# Patient Record
Sex: FEMALE | Race: WHITE | NOT HISPANIC OR LATINO | Employment: FULL TIME | URBAN - METROPOLITAN AREA
[De-identification: names, ages, dates, MRNs, and addresses within clinical notes are randomized per-mention and may not be internally consistent; named-entity substitution may affect disease eponyms.]

---

## 2021-03-14 ENCOUNTER — IMMUNIZATION (OUTPATIENT)
Dept: INTERNAL MEDICINE | Facility: CLINIC | Age: 26
End: 2021-03-14
Payer: MEDICAID

## 2021-03-14 DIAGNOSIS — Z23 NEED FOR VACCINATION: Primary | ICD-10-CM

## 2021-03-14 PROCEDURE — 0031A COVID-19,VECTOR-NR,RS-AD26,PF,0.5 ML DOSE VACCINE (JANSSEN): CPT | Mod: PBBFAC

## 2021-03-26 ENCOUNTER — OFFICE VISIT (OUTPATIENT)
Dept: FAMILY MEDICINE | Facility: CLINIC | Age: 26
End: 2021-03-26
Payer: MEDICAID

## 2021-03-26 VITALS
HEIGHT: 62 IN | OXYGEN SATURATION: 99 % | SYSTOLIC BLOOD PRESSURE: 128 MMHG | RESPIRATION RATE: 19 BRPM | WEIGHT: 208 LBS | DIASTOLIC BLOOD PRESSURE: 68 MMHG | BODY MASS INDEX: 38.28 KG/M2 | TEMPERATURE: 98 F | HEART RATE: 76 BPM

## 2021-03-26 DIAGNOSIS — F41.9 ANXIETY: ICD-10-CM

## 2021-03-26 DIAGNOSIS — Z86.39 HISTORY OF NON ANEMIC VITAMIN B12 DEFICIENCY: ICD-10-CM

## 2021-03-26 DIAGNOSIS — Z00.00 ENCOUNTER FOR PREVENTIVE HEALTH EXAMINATION: Primary | ICD-10-CM

## 2021-03-26 PROCEDURE — 99385 PR PREVENTIVE VISIT,NEW,18-39: ICD-10-PCS | Mod: S$GLB,,, | Performed by: NURSE PRACTITIONER

## 2021-03-26 PROCEDURE — 99385 PREV VISIT NEW AGE 18-39: CPT | Mod: S$GLB,,, | Performed by: NURSE PRACTITIONER

## 2021-03-26 RX ORDER — CLONAZEPAM 0.5 MG/1
0.5 TABLET ORAL 2 TIMES DAILY PRN
COMMUNITY
End: 2021-03-26 | Stop reason: SDUPTHER

## 2021-03-26 RX ORDER — BUPROPION HYDROCHLORIDE 150 MG/1
TABLET, FILM COATED, EXTENDED RELEASE ORAL
COMMUNITY
Start: 2021-01-13 | End: 2021-03-26 | Stop reason: CLARIF

## 2021-03-26 RX ORDER — CLONAZEPAM 0.5 MG/1
0.5 TABLET ORAL 2 TIMES DAILY PRN
Qty: 30 TABLET | Refills: 0 | Status: SHIPPED | OUTPATIENT
Start: 2021-03-26 | End: 2021-04-25

## 2021-03-26 RX ORDER — BUPROPION HYDROCHLORIDE 300 MG/1
300 TABLET ORAL DAILY
Qty: 30 TABLET | Refills: 11 | Status: SHIPPED | OUTPATIENT
Start: 2021-03-26

## 2021-05-02 LAB
ABS NRBC COUNT: 0 X 10 3/UL (ref 0–0.01)
ABSOLUTE BASOPHIL: 0.05 X 10 3/UL (ref 0–0.22)
ABSOLUTE EOSINOPHIL: 0.21 X 10 3/UL (ref 0.04–0.54)
ABSOLUTE IMMATURE GRAN: 0.02 X 10 3/UL (ref 0–0.04)
ABSOLUTE LYMPHOCYTE: 2.21 X 10 3/UL (ref 0.86–4.75)
ABSOLUTE MONOCYTE: 0.43 X 10 3/UL (ref 0.22–1.08)
ALBUMIN SERPL-MCNC: 4.2 G/DL (ref 3.5–5.2)
ALBUMIN/GLOB SERPL ELPH: 1.4 {RATIO} (ref 1–2.7)
ALP ISOS SERPL LEV INH-CCNC: 85 U/L (ref 35–105)
ALT (SGPT): 8 U/L (ref 0–33)
AMORPH URATE CRY URNS QL MICRO: NEGATIVE
ANION GAP SERPL CALC-SCNC: 13 MMOL/L (ref 8–17)
AST SERPL-CCNC: 10 U/L (ref 0–32)
B12: 602 PG/ML (ref 232–1245)
BACTERIA #/AREA URNS HPF: ABNORMAL /[HPF]
BASOPHILS NFR BLD: 0.7 % (ref 0.2–1.2)
BILIRUB UR QL STRIP: NEGATIVE
BILIRUBIN, TOTAL: 0.21 MG/DL (ref 0–1.2)
BUN/CREAT SERPL: 15.9 (ref 6–20)
CALCIUM SERPL-MCNC: 9.2 MG/DL (ref 8.6–10.2)
CARBON DIOXIDE, CO2: 23 MMOL/L (ref 22–29)
CHLORIDE: 103 MMOL/L (ref 98–107)
CHOLEST SERPL-MSCNC: 198 MG/DL (ref 100–200)
CLARITY UR: CLEAR
COLOR UR: YELLOW
CREAT SERPL-MCNC: 0.9 MG/DL (ref 0.5–0.9)
EOSINOPHIL NFR BLD: 3 % (ref 0.7–7)
EPITHELIAL CELLS: ABNORMAL
ESTIMATED AVERAGE GLUCOSE: 101 MG/DL
GFR ESTIMATION: 75.68
GLOBULIN: 2.9 G/DL (ref 1.5–4.5)
GLUCOSE (UA): NEGATIVE MG/DL
GLUCOSE: 86 MG/DL (ref 74–106)
HBA1C MFR BLD: 5.1 % (ref 4–6)
HCT VFR BLD AUTO: 41.5 % (ref 37–47)
HDLC SERPL-MCNC: 76 MG/DL
HGB BLD-MCNC: 12.7 G/DL (ref 12–16)
IMMATURE GRANULOCYTES: 0.3 % (ref 0–0.5)
KETONES UR QL STRIP: NEGATIVE MG/DL
LDL/HDL RATIO: 1.4 (ref 1–3)
LDLC SERPL CALC-MCNC: 103.2 MG/DL (ref 0–100)
LEUKOCYTE ESTERASE UR QL STRIP: ABNORMAL
LYMPHOCYTES NFR BLD: 31.2 % (ref 19.3–53.1)
MCH RBC QN AUTO: 27.4 PG (ref 27–32)
MCHC RBC AUTO-ENTMCNC: 30.6 G/DL (ref 32–36)
MCV RBC AUTO: 89.4 FL (ref 82–100)
MONOCYTES NFR BLD: 6.1 % (ref 4.7–12.5)
MUCOUS THREADS URNS QL MICRO: NEGATIVE
NEUTROPHILS # BLD AUTO: 4.16 X 10 3/UL (ref 2.15–7.56)
NEUTROPHILS NFR BLD: 58.7 %
NITRITE UR QL STRIP: NEGATIVE
NUCLEATED RED BLOOD CELLS: 0 /100 WBC (ref 0–0.2)
OCCULT BLOOD: NEGATIVE
PH, URINE: 6 (ref 5–7.5)
PLATELET # BLD AUTO: 318 X 10 3/UL (ref 135–400)
POTASSIUM: 4.6 MMOL/L (ref 3.5–5.1)
PROT SNV-MCNC: 7.1 G/DL (ref 6.4–8.3)
PROT UR QL STRIP: NEGATIVE MG/DL
RBC # BLD AUTO: 4.64 X 10 6/UL (ref 4.2–5.4)
RBC/HPF: NEGATIVE
RDW-SD: 46.2 FL (ref 37–54)
SODIUM: 139 MMOL/L (ref 136–145)
SP GR UR STRIP: 1.01 (ref 1–1.03)
T4, FREE: 1.02 NG/DL (ref 0.93–1.7)
TRIGL SERPL-MCNC: 94 MG/DL (ref 0–150)
TSH W/REFLEX TO FT4: 8.36 UIU/ML (ref 0.27–4.2)
UREA NITROGEN (BUN): 14.3 MG/DL (ref 6–20)
URINE CULTURE, ROUTINE: NORMAL
UROBILINOGEN, URINE: NORMAL E.U./DL (ref 0–1)
WBC # BLD: 7.08 X 10 3/UL (ref 4.3–10.8)
WBC/HPF: ABNORMAL

## 2021-05-03 DIAGNOSIS — R79.89 ABNORMAL THYROID BLOOD TEST: Primary | ICD-10-CM

## 2021-05-04 DIAGNOSIS — E03.9 HYPOTHYROIDISM, UNSPECIFIED TYPE: Primary | ICD-10-CM

## 2021-05-04 LAB
T4, FREE: 1.08 NG/DL (ref 0.93–1.7)
TSH SERPL DL<=0.005 MIU/L-ACNC: 9.71 UIU/ML (ref 0.27–4.2)

## 2021-05-04 RX ORDER — LEVOTHYROXINE SODIUM 25 UG/1
25 TABLET ORAL
Qty: 30 TABLET | Refills: 1 | Status: SHIPPED | OUTPATIENT
Start: 2021-05-04 | End: 2021-12-23 | Stop reason: SDUPTHER

## 2021-07-01 ENCOUNTER — PATIENT MESSAGE (OUTPATIENT)
Dept: ADMINISTRATIVE | Facility: OTHER | Age: 26
End: 2021-07-01

## 2021-12-22 ENCOUNTER — OFFICE VISIT (OUTPATIENT)
Dept: FAMILY MEDICINE | Facility: CLINIC | Age: 26
End: 2021-12-22
Payer: MEDICAID

## 2021-12-22 VITALS
TEMPERATURE: 98 F | SYSTOLIC BLOOD PRESSURE: 108 MMHG | WEIGHT: 220.13 LBS | HEART RATE: 83 BPM | HEIGHT: 61 IN | BODY MASS INDEX: 41.56 KG/M2 | OXYGEN SATURATION: 98 % | DIASTOLIC BLOOD PRESSURE: 72 MMHG

## 2021-12-22 DIAGNOSIS — E78.5 HYPERLIPIDEMIA, UNSPECIFIED HYPERLIPIDEMIA TYPE: ICD-10-CM

## 2021-12-22 DIAGNOSIS — Z00.00 ROUTINE GENERAL MEDICAL EXAMINATION AT A HEALTH CARE FACILITY: Primary | ICD-10-CM

## 2021-12-22 DIAGNOSIS — E66.01 CLASS 3 SEVERE OBESITY WITH BODY MASS INDEX (BMI) OF 40.0 TO 44.9 IN ADULT, UNSPECIFIED OBESITY TYPE, UNSPECIFIED WHETHER SERIOUS COMORBIDITY PRESENT: ICD-10-CM

## 2021-12-22 DIAGNOSIS — M54.9 BACK PAIN, UNSPECIFIED BACK LOCATION, UNSPECIFIED BACK PAIN LATERALITY, UNSPECIFIED CHRONICITY: ICD-10-CM

## 2021-12-22 DIAGNOSIS — F41.9 ANXIETY: ICD-10-CM

## 2021-12-22 LAB
ABS NRBC COUNT: 0 X 10 3/UL (ref 0–0.01)
ABSOLUTE BASOPHIL: 0.06 X 10 3/UL (ref 0–0.22)
ABSOLUTE EOSINOPHIL: 0.33 X 10 3/UL (ref 0.04–0.54)
ABSOLUTE IMMATURE GRAN: 0.03 X 10 3/UL (ref 0–0.04)
ABSOLUTE LYMPHOCYTE: 2.82 X 10 3/UL (ref 0.86–4.75)
ABSOLUTE MONOCYTE: 0.52 X 10 3/UL (ref 0.22–1.08)
ALBUMIN SERPL-MCNC: 4.5 G/DL (ref 3.5–5.2)
ALP ISOS SERPL LEV INH-CCNC: 84 U/L (ref 35–105)
ALT (SGPT): 9 U/L (ref 0–33)
ANION GAP SERPL CALC-SCNC: 13 MMOL/L (ref 8–17)
AST SERPL-CCNC: 18 U/L (ref 0–32)
BASOPHILS NFR BLD: 0.7 % (ref 0.2–1.2)
BILIRUB CONJ+UNCONJ SERPL-MCNC: NORMAL MG/DL (ref 0.1–0.8)
BILIRUBIN DIRECT+TOT PNL SERPL-MCNC: <0.2 MG/DL (ref 0–0.3)
BILIRUBIN, TOTAL: 0.16 MG/DL (ref 0–1.2)
BUN/CREAT SERPL: 14.1 (ref 6–20)
CALCIUM SERPL-MCNC: 9.6 MG/DL (ref 8.6–10.2)
CARBON DIOXIDE, CO2: 22 MMOL/L (ref 22–29)
CHLORIDE: 107 MMOL/L (ref 98–107)
CHOLEST SERPL-MSCNC: 197 MG/DL (ref 100–200)
CREAT SERPL-MCNC: 0.76 MG/DL (ref 0.5–0.9)
EOSINOPHIL NFR BLD: 3.9 % (ref 0.7–7)
ESTIMATED AVERAGE GLUCOSE: 103 MG/DL
GFR ESTIMATION: 91.29
GLOBULIN: 3 G/DL (ref 1.5–4.5)
GLUCOSE: 74 MG/DL (ref 74–106)
HBA1C MFR BLD: 5.2 % (ref 4–6)
HCT VFR BLD AUTO: 43.9 % (ref 37–47)
HDLC SERPL-MCNC: 61 MG/DL
HGB BLD-MCNC: 13.6 G/DL (ref 12–16)
IMMATURE GRANULOCYTES: 0.4 % (ref 0–0.5)
LDL/HDL RATIO: 1.9 (ref 1–3)
LDLC SERPL CALC-MCNC: 115.6 MG/DL (ref 0–100)
LYMPHOCYTES NFR BLD: 33.5 % (ref 19.3–53.1)
MCH RBC QN AUTO: 26.5 PG (ref 27–32)
MCHC RBC AUTO-ENTMCNC: 31 G/DL (ref 32–36)
MCV RBC AUTO: 85.6 FL (ref 82–100)
MONOCYTES NFR BLD: 6.2 % (ref 4.7–12.5)
NEUTROPHILS # BLD AUTO: 4.66 X 10 3/UL (ref 2.15–7.56)
NEUTROPHILS NFR BLD: 55.3 % (ref 34–71.1)
NUCLEATED RED BLOOD CELLS: 0 /100 WBC (ref 0–0.2)
PLATELET # BLD AUTO: 418 X 10 3/UL (ref 135–400)
POTASSIUM: 4.3 MMOL/L (ref 3.5–5.1)
PROT SNV-MCNC: 7.5 G/DL (ref 6.4–8.3)
RBC # BLD AUTO: 5.13 X 10 6/UL (ref 4.2–5.4)
RDW-SD: 41.2 FL (ref 37–54)
SODIUM: 142 MMOL/L (ref 136–145)
TRIGL SERPL-MCNC: 102 MG/DL (ref 0–150)
TSH SERPL DL<=0.005 MIU/L-ACNC: 5.51 UIU/ML (ref 0.27–4.2)
UREA NITROGEN (BUN): 10.7 MG/DL (ref 6–20)
VITAMIN D (25OHD): 18.7 NG/ML
WBC # BLD: 8.42 X 10 3/UL (ref 4.3–10.8)

## 2021-12-22 PROCEDURE — 99204 PR OFFICE/OUTPT VISIT, NEW, LEVL IV, 45-59 MIN: ICD-10-PCS | Mod: S$GLB,,, | Performed by: INTERNAL MEDICINE

## 2021-12-22 PROCEDURE — 1160F RVW MEDS BY RX/DR IN RCRD: CPT | Mod: CPTII,S$GLB,, | Performed by: INTERNAL MEDICINE

## 2021-12-22 PROCEDURE — 1159F PR MEDICATION LIST DOCUMENTED IN MEDICAL RECORD: ICD-10-PCS | Mod: CPTII,S$GLB,, | Performed by: INTERNAL MEDICINE

## 2021-12-22 PROCEDURE — 1160F PR REVIEW ALL MEDS BY PRESCRIBER/CLIN PHARMACIST DOCUMENTED: ICD-10-PCS | Mod: CPTII,S$GLB,, | Performed by: INTERNAL MEDICINE

## 2021-12-22 PROCEDURE — 1159F MED LIST DOCD IN RCRD: CPT | Mod: CPTII,S$GLB,, | Performed by: INTERNAL MEDICINE

## 2021-12-22 PROCEDURE — 99204 OFFICE O/P NEW MOD 45 MIN: CPT | Mod: S$GLB,,, | Performed by: INTERNAL MEDICINE

## 2021-12-22 RX ORDER — PHENTERMINE HYDROCHLORIDE 37.5 MG/1
37.5 TABLET ORAL
Qty: 30 TABLET | Refills: 0 | Status: SHIPPED | OUTPATIENT
Start: 2021-12-22 | End: 2022-01-21

## 2021-12-22 RX ORDER — MELOXICAM 15 MG/1
15 TABLET ORAL DAILY
Qty: 30 TABLET | Refills: 6 | Status: SHIPPED | OUTPATIENT
Start: 2021-12-22

## 2021-12-23 DIAGNOSIS — E03.9 HYPOTHYROIDISM, UNSPECIFIED TYPE: ICD-10-CM

## 2021-12-23 RX ORDER — LEVOTHYROXINE SODIUM 25 UG/1
25 TABLET ORAL
Qty: 90 TABLET | Refills: 3 | Status: SHIPPED | OUTPATIENT
Start: 2021-12-23 | End: 2022-02-21

## 2022-01-24 ENCOUNTER — TELEPHONE (OUTPATIENT)
Dept: FAMILY MEDICINE | Facility: CLINIC | Age: 27
End: 2022-01-24
Payer: MEDICAID

## 2022-01-24 NOTE — TELEPHONE ENCOUNTER
----- Message from Maame Charlton LPN sent at 1/24/2022  4:31 PM CST -----  Regarding: needs an appt    ----- Message -----  From: Peri Leggett  Sent: 1/24/2022   4:02 PM CST  To: [facility] Staff    Type:  Sooner Apoointment Request    Caller is requesting a sooner appointment.  Caller declined first available appointment listed below.  Caller will not accept being placed on the waitlist and is requesting a message be sent to doctor.  Name of Caller: Vy Quiroga  When is the first available appointment? 03/31  Symptoms: Mental health   Would the patient rather a call back or a response via [facility]? Call back   Best Call Back Number: [phone] (home)

## 2022-01-24 NOTE — TELEPHONE ENCOUNTER
----- Message from Peri Leggett sent at 1/24/2022  4:00 PM CST -----  Type:  Sooner Apoointment Request    Caller is requesting a sooner appointment.  Caller declined first available appointment listed below.  Caller will not accept being placed on the waitlist and is requesting a message be sent to doctor.  Name of Caller: Vy Junaid  When is the first available appointment? 03/31  Symptoms: Mental health   Would the patient rather a call back or a response via [facility]? Call back   Best Call Back Number: [phone] (home)

## 2022-01-25 ENCOUNTER — TELEPHONE (OUTPATIENT)
Dept: FAMILY MEDICINE | Facility: CLINIC | Age: 27
End: 2022-01-25
Payer: MEDICAID

## 2022-01-25 NOTE — TELEPHONE ENCOUNTER
----- Message from Ángela Peralta MA sent at 1/25/2022  4:09 PM CST -----  Contact: Pt    ----- Message -----  From: Ramon Larose  Sent: 1/25/2022   3:26 PM CST  To: [facility] Staff    Type:  Patient Returning Call    Who Called: pt   Who Left Message for Patient:nurse   Does the patient know what this is regarding?: mental health   Would the patient rather a call back or a response via [facility]? Phone   Best Call Back Number: [phone]  Additional Information: pls call around 4 pm in time for pt to get off and get to her car

## 2022-01-25 NOTE — TELEPHONE ENCOUNTER
I spoke to patient and earliest appt is 2/10/2022 but she said she is having mental issues, can she get a referral or can we see her this week.  Thanks

## 2022-10-13 ENCOUNTER — OFFICE VISIT (OUTPATIENT)
Dept: FAMILY MEDICINE | Facility: CLINIC | Age: 27
End: 2022-10-13
Payer: MEDICAID

## 2022-10-13 VITALS
BODY MASS INDEX: 40.59 KG/M2 | DIASTOLIC BLOOD PRESSURE: 66 MMHG | HEART RATE: 71 BPM | TEMPERATURE: 98 F | SYSTOLIC BLOOD PRESSURE: 105 MMHG | WEIGHT: 215 LBS | HEIGHT: 61 IN | OXYGEN SATURATION: 99 %

## 2022-10-13 DIAGNOSIS — Z00.00 ROUTINE ADULT HEALTH MAINTENANCE: ICD-10-CM

## 2022-10-13 DIAGNOSIS — Z11.59 ENCOUNTER FOR SCREENING FOR OTHER VIRAL DISEASES: ICD-10-CM

## 2022-10-13 DIAGNOSIS — F41.9 ANXIETY AND DEPRESSION: ICD-10-CM

## 2022-10-13 DIAGNOSIS — F32.A ANXIETY AND DEPRESSION: ICD-10-CM

## 2022-10-13 DIAGNOSIS — R53.83 FATIGUE, UNSPECIFIED TYPE: Primary | ICD-10-CM

## 2022-10-13 PROCEDURE — 99214 PR OFFICE/OUTPT VISIT, EST, LEVL IV, 30-39 MIN: ICD-10-PCS | Mod: S$GLB,,, | Performed by: OBSTETRICS & GYNECOLOGY

## 2022-10-13 PROCEDURE — 99214 OFFICE O/P EST MOD 30 MIN: CPT | Mod: S$GLB,,, | Performed by: OBSTETRICS & GYNECOLOGY

## 2022-10-13 PROCEDURE — 1159F PR MEDICATION LIST DOCUMENTED IN MEDICAL RECORD: ICD-10-PCS | Mod: CPTII,S$GLB,, | Performed by: OBSTETRICS & GYNECOLOGY

## 2022-10-13 PROCEDURE — 3078F PR MOST RECENT DIASTOLIC BLOOD PRESSURE < 80 MM HG: ICD-10-PCS | Mod: CPTII,S$GLB,, | Performed by: OBSTETRICS & GYNECOLOGY

## 2022-10-13 PROCEDURE — 1159F MED LIST DOCD IN RCRD: CPT | Mod: CPTII,S$GLB,, | Performed by: OBSTETRICS & GYNECOLOGY

## 2022-10-13 PROCEDURE — 3074F SYST BP LT 130 MM HG: CPT | Mod: CPTII,S$GLB,, | Performed by: OBSTETRICS & GYNECOLOGY

## 2022-10-13 PROCEDURE — 3074F PR MOST RECENT SYSTOLIC BLOOD PRESSURE < 130 MM HG: ICD-10-PCS | Mod: CPTII,S$GLB,, | Performed by: OBSTETRICS & GYNECOLOGY

## 2022-10-13 PROCEDURE — 3078F DIAST BP <80 MM HG: CPT | Mod: CPTII,S$GLB,, | Performed by: OBSTETRICS & GYNECOLOGY

## 2022-10-13 RX ORDER — ESCITALOPRAM OXALATE 10 MG/1
10 TABLET ORAL DAILY
Qty: 30 TABLET | Refills: 2 | Status: SHIPPED | OUTPATIENT
Start: 2022-10-13 | End: 2023-10-13

## 2022-10-13 NOTE — PROGRESS NOTES
Subjective:   Patient ID: Vy Quiroga is a 27 y.o. female who presents for evaluation today.    Chief Complaint:  Depression and Anxiety (Pt states that she's having a hard time holding her composure in places that she needs to be controlled. Sleeping a lot and always exhausted.)      History of Present Illness  HPI  Anxiety/Depression  Patient reports symptoms of anxiety and depression.   Onset of symptoms was approximately a few months ago.    Symptoms have been gradually worsening since that time.   She reports the following symptoms: fatigue difficulty concentrating feelings of losing control racing thoughts depressed mood.   Denies psychomotor agitation, psychomotor retardation, suicidal attempt, suicidal thoughts, suicidal thoughts with specific plan, and suicidal thoughts without plan.   Previous treatment includes medication Wellbutrin, Zoloft, and clonazepam .   She reports the following medication side effects: none.    Fatigue  She states she is exhausted all the time. States she goes to bed at 7:30pm and wakes up at 3:45-4am. Admits to stress, anxiety, and depression.     FAMILY HISTORY  History reviewed. No pertinent family history.  SOCIAL HISTORY  Social History     Tobacco Use   Smoking Status Never   Smokeless Tobacco Current   Tobacco Comments    vape   ,   Social History     Substance and Sexual Activity   Alcohol Use Yes    Comment: Ocassionally     MEDICATIONS  No outpatient medications have been marked as taking for the 10/13/22 encounter (Office Visit) with Lore Samaniego NP.     Review of Systems   Review of Systems   Constitutional:  Positive for fatigue. Negative for activity change, appetite change, fever and unexpected weight change.   HENT:  Negative for dental problem, hearing loss, sneezing, sore throat, trouble swallowing and voice change.    Eyes:  Negative for visual disturbance.   Respiratory:  Negative for choking, chest tightness, shortness of breath and stridor.   "  Cardiovascular:  Negative for chest pain and palpitations.   Gastrointestinal:  Negative for abdominal pain, anal bleeding, blood in stool, change in bowel habit, nausea, vomiting, fecal incontinence and change in bowel habit.   Endocrine: Negative for polydipsia, polyphagia and polyuria.   Genitourinary:  Negative for decreased urine volume, difficulty urinating, dysuria, frequency, hematuria and urgency.   Musculoskeletal:  Negative for gait problem, joint swelling, neck pain, neck stiffness and joint deformity.   Integumentary:  Negative for color change, pallor, rash, wound and mole/lesion.   Allergic/Immunologic: Negative for immunocompromised state.   Neurological:  Negative for vertigo, seizures, syncope, weakness, light-headedness, coordination difficulties and coordination difficulties.   Hematological:  Negative for adenopathy. Does not bruise/bleed easily.   Psychiatric/Behavioral:  Positive for dysphoric mood. Negative for agitation, behavioral problems, confusion, hallucinations, sleep disturbance and suicidal ideas. The patient is nervous/anxious.        Objective:    VITALS  BP Readings from Last 1 Encounters:   10/13/22 105/66   Weight: 97.5 kg (215 lb)   Height: 5' 1" (154.9 cm)   BMI Readings from Last 1 Encounters:   10/13/22 40.62 kg/m²       Physical Exam  Vitals reviewed.   Constitutional:       General: She is not in acute distress.     Appearance: Normal appearance. She is not ill-appearing, toxic-appearing or diaphoretic.   HENT:      Head: Normocephalic and atraumatic.      Right Ear: External ear normal.      Left Ear: External ear normal.      Nose: Nose normal. No congestion or rhinorrhea.   Eyes:      General:         Right eye: No discharge.         Left eye: No discharge.   Neck:      Thyroid: No thyroid mass, thyromegaly or thyroid tenderness.   Cardiovascular:      Rate and Rhythm: Normal rate and regular rhythm.      Heart sounds: Normal heart sounds. No murmur heard.    No " friction rub. No gallop.   Pulmonary:      Effort: Pulmonary effort is normal. No respiratory distress.      Breath sounds: Normal breath sounds. No stridor. No wheezing, rhonchi or rales.   Musculoskeletal:         General: Normal range of motion.      Cervical back: Normal range of motion and neck supple.      Right lower leg: No edema.      Left lower leg: No edema.   Skin:     General: Skin is warm and dry.      Coloration: Skin is not jaundiced or pale.      Findings: No rash.   Neurological:      General: No focal deficit present.      Mental Status: She is alert and oriented to person, place, and time.      Gait: Gait normal.   Psychiatric:         Mood and Affect: Mood normal.         Behavior: Behavior normal.         Thought Content: Thought content normal.         Judgment: Judgment normal.       Assessment:      1. Fatigue, unspecified type    2. Anxiety and depression    3. Routine adult health maintenance    4. Encounter for screening for other viral diseases       Plan:   Fatigue, unspecified type  -     CBC Auto Differential  -     Comprehensive Metabolic Panel  -     TSH  Explained that fatigue, while a very common symptom is usually a very complicated symptom to diagnose due to the high number of differential diagnosis.  Explained how routine blood work can often eliminate many of the more common high risk conditions and also explained that an exhaustive work up often still reveals no definitive diagnosis.  Counseled patient that in a lot of cases fatigue is a symptom of over all poor exercise habits, nutrition and sleep/stress.  Counseled that no matter what plan of care shows, we should move forward with increasing exercise, improving nutrition from processed foods to more natural whole foods prepared at home and to work on sleep hygiene and stress management.  Patient verbalized understanding.    Anxiety and depression  -     Ambulatory referral/consult to Psychiatry; Future; Expected date:  10/20/2022  -     EScitalopram oxalate (LEXAPRO) 10 MG tablet; Take 1 tablet (10 mg total) by mouth once daily.  Dispense: 30 tablet; Refill: 2  Referral faxed today. Patient instructed to contact our office if a call has not received from the facility to schedule an appointment within the next 2 weeks. Educated regarding non-pharmacological methods for dealing with symptoms of anxiety such as, relaxation techniques, deep breathing exercises, exercise, etc. Discussion of pharmacological management was carried out at length. Medication regimen, side effects, mechanism of action, etc. discussed.  Please note the following aspects associated with the treatment regimen:  When first starting medication or making dose adjustments, it may take 2-4 weeks to begin feeling the effects of medication  Avoid abrupt withdrawal from treatment. If you wish to discontinue medication therapy, please contact the office before doing so to discuss how to safely get off the medication.  Instructed to report immediately to the nearest ER with any thoughts of suicidal or homicidal ideation. Please notify provider immediately.  Resources are available for dealing with suicidal thoughts: [phone] ([phone] or [email or web address]   Contact provider immediately if symptoms worsen or new symptoms appear.  Report to the nearest emergency room if symptoms of serotonin syndrome are experienced such as sudden onset of high fever, muscular rigidity, mental status changes, hyperreflexia/clonus, and uncontrolled shivering   F/u 4-6 weeks     Routine adult health maintenance  -     CBC Auto Differential  -     Comprehensive Metabolic Panel  -     Lipid Panel  -     TSH    Encounter for screening for other viral diseases  -     Hepatitis C Antibody  -     HIV 1/2 Ag/Ab (4th Gen)    Patient instructed to contact the clinic should any questions or concerns arise prior to next office visit. Risks, benefits, and alternatives discussed with  patient. Patient verbalized understanding of discussed plan of care. Asked patient if any further questions, answered no. Follow up as discussed or sooner PRN.

## 2022-11-16 ENCOUNTER — PATIENT MESSAGE (OUTPATIENT)
Dept: ADMINISTRATIVE | Facility: HOSPITAL | Age: 27
End: 2022-11-16
Payer: MEDICAID